# Patient Record
Sex: FEMALE | Race: WHITE
[De-identification: names, ages, dates, MRNs, and addresses within clinical notes are randomized per-mention and may not be internally consistent; named-entity substitution may affect disease eponyms.]

---

## 2019-09-12 ENCOUNTER — HOSPITAL ENCOUNTER (OUTPATIENT)
Dept: HOSPITAL 43 - DL.ENDO | Age: 59
Discharge: HOME | End: 2019-09-12
Attending: SURGERY
Payer: COMMERCIAL

## 2019-09-12 DIAGNOSIS — Z92.3: ICD-10-CM

## 2019-09-12 DIAGNOSIS — Z92.21: ICD-10-CM

## 2019-09-12 DIAGNOSIS — Z85.828: ICD-10-CM

## 2019-09-12 DIAGNOSIS — K64.4: ICD-10-CM

## 2019-09-12 DIAGNOSIS — Z79.899: ICD-10-CM

## 2019-09-12 DIAGNOSIS — K59.00: Primary | ICD-10-CM

## 2019-09-12 PROCEDURE — G0121 COLON CA SCRN NOT HI RSK IND: HCPCS

## 2019-09-12 NOTE — OR
DATE:  09/12/2019

 

PREOPERATIVE DIAGNOSES:  Screening colonoscopy and constipation.

 

POSTOPERATIVE DIAGNOSES:  Screening colonoscopy and constipation.

 

PROCEDURE:  Total colonoscopy.

 

ANESTHESIA:  Conscious sedation with IV Versed and fentanyl.

 

SPECIMEN:  None.

 

OPERATIVE FINDINGS:  Normal colonoscopy.

 

RECOMMENDATION:  Followup colonoscopy in 10 years for screening.

 

PROCEDURE IN DETAIL:  After adequate preparation, a colonoscope was inserted

into the rectum.  After multiple maneuvers; however, I could not negotiate the

lower portion of the sigmoid just above the rectosigmoid junction.  Multiple

maneuvers were tried, and I simply could not advance the scope safely.  I

changed this to a pediatric scope and was able to with some manipulation get

around this fixed site in the sigmoid.  At that point, then the scope was easily

advanced all the way to the cecum.  Confirmation of the cecum was made by

visualization of the ileocecal valve and palpation in the right lower quadrant.

There was also a visible light in the right lower quadrant.  The bowel prep was

very good.  On withdrawal of the scope, no abnormalities were noted.  A

photograph of the ileocecal valve had been taken.  Anal and rectal examination

reveals large external

hemorrhoids.  No significant internal hemorrhoids.  Air was suctioned from the

colon and the scope removed.

 

DD:  09/12/2019 07:18:48

DT:  09/12/2019 14:20:22

[facility]

Job #:  [id number]

## 2021-01-13 ENCOUNTER — HOSPITAL ENCOUNTER (OUTPATIENT)
Dept: HOSPITAL 43 - DL.ENDO | Age: 61
Discharge: HOME | End: 2021-01-13
Attending: SURGERY
Payer: COMMERCIAL

## 2021-01-13 DIAGNOSIS — Z01.812: ICD-10-CM

## 2021-01-13 DIAGNOSIS — Z85.048: ICD-10-CM

## 2021-01-13 DIAGNOSIS — Z20.822: ICD-10-CM

## 2021-01-13 DIAGNOSIS — K62.5: Primary | ICD-10-CM

## 2021-01-13 DIAGNOSIS — Z79.899: ICD-10-CM

## 2021-01-13 DIAGNOSIS — K64.4: ICD-10-CM

## 2021-01-13 PROCEDURE — 87635 SARS-COV-2 COVID-19 AMP PRB: CPT

## 2021-01-13 PROCEDURE — U0002 COVID-19 LAB TEST NON-CDC: HCPCS

## 2021-01-13 PROCEDURE — 45378 DIAGNOSTIC COLONOSCOPY: CPT

## 2021-01-13 NOTE — OR
DATE:  01/13/2021

 

PREOPERATIVE DIAGNOSIS:  Rectal bleeding and history of prior anal cancer.

 

POSTOPERATIVE DIAGNOSIS:  Rectal bleeding and history of prior anal cancer.

 

PROCEDURE:  Total colonoscopy.

 

ANESTHESIA:  Conscious sedation with IV Versed and fentanyl.

 

SPECIMEN:  None.

 

OPERATIVE FINDINGS:  External hemorrhoids, otherwise normal.

 

RECOMMENDATION:  Might consider a hemorrhoidectomy with this patient.  She does

not have significant internal hemorrhoids and the examination did not reveal any

unusual areas that I felt needed biopsy.  The rest of the colonoscopy is normal

and her followup should be 5 to 10 years.

 

INDICATION FOR PROCEDURE:  This 60-year-old female has a history of anal cancer

and now has new onset of rectal bleeding with bowel function.

 

PROCEDURE IN DETAIL:  After adequate preparation, a colonoscope was inserted

into the rectum.  This was quite difficult to get through a fixed area of the

sigmoid.  This is an external fixation adhesion probably from her prior

surgeries, but I was able to get the scope finally through the sigmoid and then

it was easy to pass the scope all the way to the cecum.  Confirmation of the

cecum was made by visualization of the ileocecal valve.  The bowel prep was

excellent.  On withdrawal of the scope, no abnormalities were noted.  I took a

photograph of the ileocecal valve and the anal canal.  Air was suctioned from

the colon, and the scope removed.

 

DD:  01/13/2021 08:12:30

DT:  01/13/2021 11:16:17

[facility]

Job #:  [id number]

## 2021-03-11 ENCOUNTER — HOSPITAL ENCOUNTER (OUTPATIENT)
Dept: HOSPITAL 43 - DL.SDS | Age: 61
Discharge: HOME | End: 2021-03-11
Attending: SURGERY
Payer: COMMERCIAL

## 2021-03-11 DIAGNOSIS — Z20.822: ICD-10-CM

## 2021-03-11 DIAGNOSIS — K64.4: Primary | ICD-10-CM

## 2021-03-11 DIAGNOSIS — I10: ICD-10-CM

## 2021-03-11 DIAGNOSIS — Z01.812: ICD-10-CM

## 2021-03-11 DIAGNOSIS — E66.9: ICD-10-CM

## 2021-03-11 DIAGNOSIS — Z79.890: ICD-10-CM

## 2021-03-11 DIAGNOSIS — Z79.899: ICD-10-CM

## 2021-03-11 DIAGNOSIS — Z85.048: ICD-10-CM

## 2021-03-11 DIAGNOSIS — E03.9: ICD-10-CM

## 2021-03-11 PROCEDURE — U0002 COVID-19 LAB TEST NON-CDC: HCPCS

## 2021-03-11 NOTE — OR
DATE:  03/11/2021

 

PREOPERATIVE DIAGNOSIS:  External hemorrhoids.

 

POSTOPERATIVE DIAGNOSIS:  External hemorrhoids.

 

PROCEDURE:  External hemorrhoidectomy.

 

ANESTHESIA:  Local plus MAC.

 

SPECIMEN:  Hemorrhoid.

 

INDICATIONS FOR PROCEDURE:  This 60-year-old female has multiple external

hemorrhoids.  She also has a history of anal cancer, undergoing chemotherapy

protocol for this.  She has some rectal bleeding, but underwent a recent

colonoscopy which was normal.

 

PROCEDURE IN DETAIL:  After adequate preparation, there were 2 anterior large

groups of hemorrhoids that were able to be excised as 1 specimen.  1% Xylocaine

was used to infiltrate a perianal block.  The external skin was cut in a juliana

shape and then carried down into the upper part of the anal canal.  The entire

hemorrhoid complex were removed as 1.  Hemostasis was controlled with cautery.

4-0 Vicryl sutures were used to close the deeper layer of tissue and the skin

was closed with interrupted 4-0 Vicryl.  This seemed to be adequate.  There is

one posterior small hemorrhoid that is left.  I decided to leave that since this

seems to be really adequate to clear this up.  The patient was taken to recovery

room.

 

DD:  03/11/2021 09:07:00

DT:  03/11/2021 10:20:02

[facility]

Job #:  [id number]

## 2021-09-13 ENCOUNTER — HOSPITAL ENCOUNTER (EMERGENCY)
Dept: HOSPITAL 43 - DL.ED | Age: 61
Discharge: HOME | End: 2021-09-13
Payer: COMMERCIAL

## 2021-09-13 DIAGNOSIS — Z20.822: ICD-10-CM

## 2021-09-13 DIAGNOSIS — E03.9: ICD-10-CM

## 2021-09-13 DIAGNOSIS — R05: Primary | ICD-10-CM

## 2021-09-13 DIAGNOSIS — Z79.899: ICD-10-CM

## 2021-09-13 PROCEDURE — U0002 COVID-19 LAB TEST NON-CDC: HCPCS

## 2021-09-13 NOTE — EDM.PDOC
ED HPI GENERAL MEDICAL PROBLEM





- General


Chief Complaint: Respiratory Problem


Stated Complaint: COVID TEST


Time Seen by Provider: 09/13/21 19:30


Source of Information: Reports: Patient, RN


History Limitations: Reports: No Limitations





- History of Present Illness


INITIAL COMMENTS - FREE TEXT/NARRATIVE: 





ED requesting COVID testing, Cough only sx but exposure through grandchild who 

was exposed at school. No fever No nausea or vomiting. Onset sx 3 days.


  ** Bilateral Generalized


Pain Score (Numeric/FACES): 4





- Related Data


                                    Allergies











Allergy/AdvReac Type Severity Reaction Status Date / Time


 


No Known Allergies Allergy   Verified 03/11/21 07:21











Home Meds: 


                                    Home Meds





Levothyroxine [Sythroid] 100 mcg PO DAILY 11/19/18 [History]


Venlafaxine HCl [Venlafaxine ER] 75 mg PO TID 11/19/18 [History]


buPROPion [buPROPion XL] 150 mg PO BID 11/19/18 [History]


hydroCHLOROthiazide [Hydrochlorothiazide] 25 mg PO DAILY 11/19/18 [History]











Past Medical History


HEENT History: Reports: Impaired Vision, Other (See Below)


Other HEENT History: WEARS CONTACT LENS


Cardiovascular History: Reports: Hypertension


Respiratory History: Reports: None


Gastrointestinal History: Reports: Chronic Constipation, Chronic Diarrhea, GERD,

 Hemorrhoids, Other (See Below)


Other Gastrointestinal History: urgency with diarrhea


Genitourinary History: Reports: None


OB/GYN History: Reports: Pregnancy


Musculoskeletal History: Reports: None, Fracture, Other (See Below)


Other Musculoskeletal History: CONTACTURE IN [place]


Neurological History: Reports: None


Psychiatric History: Reports: Autism, Depression


Endocrine/Metabolic History: Reports: Hypothyroidism


Hematologic History: Reports: None


Immunologic History: Reports: None


Oncologic (Cancer) History: Reports: Other (See Below)


Other Oncologic History: anal cancer


Dermatologic History: Reports: None





- Infectious Disease History


Infectious Disease History: Reports: Chicken Pox, Measles, Mumps





- Past Surgical History


Head Surgeries/Procedures: Reports: None


HEENT Surgical History: Reports: None


Cardiovascular Surgical History: Reports: None


Respiratory Surgical History: Reports: None


GI Surgical History: Reports: Cholecystectomy, Colonoscopy


Female  Surgical History: Reports: Breast Biopsy, Breast Reduction, 

Hysterectomy, Other (See Below)


Other Female  Surgeries/Procedures: granulated cyst to R) breast


Endocrine Surgical History: Reports: None


Neurological Surgical History: Reports: None


Musculoskeletal Surgical History: Reports: None


Oncologic Surgical History: Reports: None


Dermatological Surgical History: Reports: None





Social & Family History





- Tobacco Use


Tobacco Use Status *Q: Never Tobacco User





- Caffeine Use


Caffeine Use: Reports: Coffee


Caffeine Use Comment: 3 cups daily





- Recreational Drug Use


Recreational Drug Use: No





ED ROS GENERAL





- Review of Systems


Review Of Systems: Comprehensive ROS is negative, except as noted in HPI.





ED EXAM, GENERAL





- Physical Exam


Exam: See Below


Exam Limited By: No Limitations


General Appearance: Alert, No Apparent Distress


Eye Exam: Bilateral Eye: EOMI


Ears: Normal External Exam


Nose: Normal Inspection


Throat/Mouth: Normal Inspection


Head: Atraumatic, Other


Neck: Normal Inspection.  No: Lymphadenopathy (L), Lymphadenopathy (R)


Respiratory/Chest: No Respiratory Distress, Lungs Clear, Normal Breath Sounds, 

Other (rare dry cough)


GI/Abdominal: Normal Bowel Sounds


Extremities: Normal Inspection


Neurological: Alert, Oriented, Normal Cognition, Normal Gait


Psychiatric: Normal Affect, Normal Mood


Skin Exam: Warm, Dry, Intact





Course





- Vital Signs


Last Recorded V/S: 


                                Last Vital Signs











Temp  98.1 F   09/13/21 18:25


 


Pulse  106 H  09/13/21 18:25


 


Resp  20   09/13/21 18:25


 


BP  118/72   09/13/21 18:25


 


Pulse Ox  98   09/13/21 18:25














- Orders/Labs/Meds


Labs: 


                                Laboratory Tests











  09/13/21 Range/Units





  18:05 


 


SARS-CoV-2 RNA (ESTRELLA)  Negative  (NEGATIVE)  














Departure





- Departure


Time of Disposition: 20:14


Disposition: Home, Self-Care 01


Condition: Good


Clinical Impression: 


 Cough with exposure to COVID-19 virus








- Discharge Information


Instructions:  COVID-19 Frequently Asked Questions, COVID-19: How to Protect 

Yourself and Others - CDC, COVID-19: Quarantine vs. Isolation - CDC (12/17/2020)


Forms:  ED Department Discharge


Additional Instructions: 


symptom management with muccinex/ robitussin for cough


increase fluids


humidification


continue isolation


retest later this week at local Drive through or local clinic


urgent follow up if SEVERE difficulty breathing


limit use of ibuprofen


tylenol 500mg every 4 hours as needed for cough





Sepsis Event Note (ED)





- Evaluation


Sepsis Screening Result: No Definite Risk

## 2021-10-22 ENCOUNTER — HOSPITAL ENCOUNTER (EMERGENCY)
Dept: HOSPITAL 43 - DL.ED | Age: 61
Discharge: HOME | End: 2021-10-22
Payer: COMMERCIAL

## 2021-10-22 DIAGNOSIS — E03.9: ICD-10-CM

## 2021-10-22 DIAGNOSIS — U07.1: Primary | ICD-10-CM

## 2021-10-22 DIAGNOSIS — I10: ICD-10-CM

## 2021-10-22 DIAGNOSIS — Z79.899: ICD-10-CM

## 2021-10-22 DIAGNOSIS — J12.82: ICD-10-CM

## 2021-10-22 DIAGNOSIS — E87.6: ICD-10-CM

## 2021-10-22 LAB
ANION GAP SERPL CALC-SCNC: 15.3 MEQ/L (ref 7–13)
APTT PPP: 26.4 SEC (ref 22–34)
CHLORIDE SERPL-SCNC: 93 MMOL/L (ref 98–107)
SODIUM SERPL-SCNC: 133 MMOL/L (ref 136–145)

## 2021-10-22 NOTE — CT
PROCEDURE INFORMATION: 

Exam: CT Chest With Contrast; Diagnostic 

Exam date and time: 10/22/2021 12:25 PM 

Age: 61 years old 

Clinical indication: Shortness of breath; Additional info: R/O pulmonary 

embolism 



TECHNIQUE: 

Imaging protocol: Diagnostic computed tomography of the chest with contrast. 

Radiation optimization: All CT scans at this facility use at least one of these 

dose optimization techniques: automated exposure control; mA and/or kV 

adjustment per patient size (includes targeted exams where dose is matched to 

clinical indication); or iterative reconstruction. 

Contrast material: ISOVUE 370; Contrast volume: 80 ml; Contrast route: 

INTRAVENOUS (IV);  



COMPARISON: 

No relevant prior studies available. 



FINDINGS: 

Lungs:  Scattered ground-glass airspace opacities in both lungs.  No masses. 

Pleural spaces: Unremarkable. No pneumothorax. No pleural effusion. 

Heart: Unremarkable. No cardiomegaly. No pericardial effusion. 

Aorta: Unremarkable. No aortic aneurysm. 

Lymph nodes: Unremarkable. No enlarged lymph nodes. 



Bones/joints: Unremarkable. No acute fracture. 

Soft tissues: Unremarkable. 



IMPRESSION: 

Multifocal pneumonitis concerning for :.  No evidence of acute PE.

## 2021-10-22 NOTE — EDM.PDOC
ED HPI GENERAL MEDICAL PROBLEM





- General


Stated Complaint: TO ER FROM SDS


Time Seen by Provider: 10/22/21 12:27


Source of Information: Reports: Patient, Provider, RN, RN Notes Reviewed


History Limitations: Reports: No Limitations





- History of Present Illness


INITIAL COMMENTS - FREE TEXT/NARRATIVE: 


Rosanne is a 62 y/o female who presents to the ED from same day surgery following 

administration of monoclonal antibodies for COVID-19.  The patient reports her 

symptoms began five days ago, including shortness of breath, fatigue, muscle 

aches, and cough, and have since worsened in that time.  She tested positive for

a COVID-19 infection yesterday in the clinic.  The patient states she did not 

become more short of breath during or following the monoclonal antibody 

treatment.  She notes her  is home with similar symptoms and they have 

been caring for their four y/o granddaughter who has not yet experienced similar

symptoms.  She denies fever, shaking chills, vision changes, dizziness, chest 

pain/pressure, palpitations, nausea, vomiting, abdominal pain, or diarrhea.  She

has taken no medications for her symptoms.  The patient denies tobacco, alcohol,

or recreational drug use. 








- Related Data


                                    Allergies











Allergy/AdvReac Type Severity Reaction Status Date / Time


 


No Known Allergies Allergy   Verified 10/22/21 14:01











Home Meds: 


                                    Home Meds





Venlafaxine HCl [Venlafaxine ER] 75 mg PO TID 18 [History]


hydroCHLOROthiazide [Hydrochlorothiazide] 25 mg PO DAILY 18 [History]


LORazepam [Ativan] 0.5 mg PO ASDIRECTED 10/22/21 [History]


Levothyroxine 112 mcg PO ACBREAKFAST 10/22/21 [History]


Oxybutynin Chloride [Ditropan Xl] 10 mg PO DAILY 10/22/21 [History]


buPROPion HCL [Bupropion HCl Sr] 300 mg PO DAILY 10/22/21 [History]


busPIRone HCl [busPIRone] 30 mg PO DAILY 10/22/21 [History]











Past Medical History


HEENT History: Reports: Impaired Vision, Other (See Below)


Other HEENT History: WEARS CONTACT LENS


Cardiovascular History: Reports: Hypertension


Respiratory History: Reports: None, SOB


Gastrointestinal History: Reports: Chronic Constipation, Chronic Diarrhea, GERD,

 Hemorrhoids, Other (See Below)


Other Gastrointestinal History: urgency with diarrhea


Genitourinary History: Reports: None


OB/GYN History: Reports: Pregnancy


Musculoskeletal History: Reports: None, Fracture, Other (See Below)


Other Musculoskeletal History: CONTACTURE IN [place]ILAT


Neurological History: Reports: None


Psychiatric History: Reports: Anxiety, Depression


Endocrine/Metabolic History: Reports: Hypothyroidism


Hematologic History: Reports: None


Immunologic History: Reports: None


Oncologic (Cancer) History: Reports: Other (See Below)


Other Oncologic History: anal cancer


Dermatologic History: Reports: None





- Infectious Disease History


Infectious Disease History: Reports: Chicken Pox, Measles, Mumps, Novel 

Coronavirus





- Past Surgical History


Head Surgeries/Procedures: Reports: None


HEENT Surgical History: Reports: None


Cardiovascular Surgical History: Reports: None


Respiratory Surgical History: Reports: None


GI Surgical History: Reports: Cholecystectomy, Colonoscopy


Other GI Surgeries/Procedures: Hemorrhoidectomy


Female  Surgical History: Reports: Breast Biopsy, Breast Reduction, 

Hysterectomy, Other (See Below)


Other Female  Surgeries/Procedures: granulated cyst to R) breast


Endocrine Surgical History: Reports: None


Neurological Surgical History: Reports: None


Musculoskeletal Surgical History: Reports: None


Oncologic Surgical History: Reports: None


Dermatological Surgical History: Reports: None





Social & Family History





- Family History


Family Medical History: No Pertinent Family History





- Caffeine Use


Caffeine Use: Reports: Coffee


Caffeine Use Comment: 3 cups daily





ED ROS GENERAL





- Review of Systems


Review Of Systems: Comprehensive ROS is negative, except as noted in HPI.





ED EXAM, GENERAL





- Physical Exam


Exam: See Below


Exam Limited By: No Limitations


General Appearance: Alert, No Apparent Distress


Eye Exam: Bilateral Eye: EOMI, Normal Inspection, PERRL (3mm)


Ears: Normal External Exam, Normal Canal, Hearing Grossly Normal, Normal TMs


Ear Exam: Bilateral Ear: Auricle Normal, Canal Normal, TM normal


Nose: Normal Inspection, Normal Mucosa, No Blood


Throat/Mouth: Normal Inspection, Normal Oropharynx, Normal Voice, No Airway 

Compromise


Head: Atraumatic, Normocephalic


Neck: Normal Inspection, Supple, Non-Tender, Full Range of Motion.  No: 

Lymphadenopathy (L), Lymphadenopathy (R)


Respiratory/Chest: No Respiratory Distress, Lungs Clear, Normal Breath Sounds, 

No Accessory Muscle Use, Chest Non-Tender.  No: Crackles, Rales, Rhonchi, 

Wheezing


Cardiovascular: Normal Peripheral Pulses, Regular Rate, Rhythm, No Gallop, No 

Murmur, No Rub


Peripheral Pulses: 2+: Radial (L), Radial (R)


GI/Abdominal: Normal Bowel Sounds, Soft, Non-Tender, No Distention, No Abnormal 

Bruit, No Mass, Pelvis Stable


 (Female) Exam: Deferred


Rectal (Female) Exam: Deferred


Back Exam: Normal Inspection, Full Range of Motion


Extremities: Normal Inspection, Normal Range of Motion, Normal Capillary Refill


Neurological: Alert, Oriented, CN II-XII Intact, Normal Cognition, Normal Gait, 

No Motor/Sensory Deficits


Psychiatric: Normal Affect, Normal Mood


Skin Exam: Warm, Dry, Intact, Normal Color, No Rash.  No: Cyanosis, Jaundice, 

Mottled, Pallor


Lymphatic: No Adenopathy





Course





- Vital Signs


Last Recorded V/S: 


                                Last Vital Signs











Temp  99.6 F   10/22/21 13:53


 


Pulse  85   10/22/21 13:53


 


Resp  18   10/22/21 13:53


 


BP  107/72   10/22/21 13:53


 


Pulse Ox  100   10/22/21 13:53














- Orders/Labs/Meds


Labs: 


                                Laboratory Tests











  10/22/21 10/22/21 10/22/21 Range/Units





  13:37 13:37 13:37 


 


WBC  8.7    (5.0-10.0)  10^3/uL


 


RBC  5.13    (4.2-5.4)  10^6/uL


 


Hgb  13.7    (12.0-16.0)  g/dL


 


Hct  40.1    (37.0-47.0)  %


 


MCV  78.2 L D    ()  fL


 


MCH  26.7 L    (27.0-34.0)  pg


 


MCHC  34.2    (33.0-35.0)  g/dL


 


Plt Count  238  D    (150-450)  10^3/uL


 


Neut % (Auto)  82.9 H    (42.2-75.2)  %


 


Lymph % (Auto)  12.7 L    (20.5-50.1)  %


 


Mono % (Auto)  4.2    (2-8)  %


 


Eos % (Auto)  0.1 L    (1.0-3.0)  %


 


Baso % (Auto)  0.1    (0.0-1.0)  %


 


PT     (9.0-12.0)  SEC


 


INR     (0.9-1.2)  


 


APTT     (22.0-34.0)  SEC


 


D-Dimer, Quantitative     (0-400)  ng/mL


 


Sodium   133 L D   (136-145)  mmol/L


 


Potassium   3.3 L   (3.5-5.1)  mmol/L


 


Chloride   93 L   ()  mmol/L


 


Carbon Dioxide   28   (21-32)  mmol/L


 


Anion Gap   15.3 H   (7-13)  mEq/L


 


BUN   15   (7-18)  mg/dL


 


Creatinine   0.89   (0.55-1.02)  mg/dL


 


Est Cr Clr Drug Dosing   54.21   mL/min


 


Estimated GFR (MDRD)   > 60   


 


BUN/Creatinine Ratio   16.9   (No establ ref range)  


 


Glucose   93   (70-99)  mg/dL


 


Lactic Acid    1.3  (0.4-2.0)  mmol/L


 


Calcium   8.7   (8.5-10.1)  mg/dL


 


Magnesium   2.2   (1.8-2.4)  mg/dL


 


Total Bilirubin   1.1 H   (0.2-1.0)  mg/dL


 


AST   38 H   (15-37)  U/L


 


ALT   74 H   (14-59)  U/L


 


Alkaline Phosphatase   96   ()  U/L


 


Troponin I High Sens   5   (<=51)  pg/mL


 


C-Reactive Protein   12.4 H   (0.0-0.9)  mg/dL


 


Total Protein   7.4   (6.4-8.2)  g/dL


 


Albumin   3.1 L   (3.4-5.0)  g/dL


 


Globulin   4.3   


 


Albumin/Globulin Ratio   0.72   


 


Amylase   47   ()  U/L


 


Lipase   204   ()  U/L


 


TSH, Ultra Sensitive   0.18 L   (0.36-3.74)  uIU/mL


 


Urine Color     (YELLOW)  


 


Urine Appearance     (CLEAR)  


 


Urine pH     (5.0-9.0)  


 


Ur Specific Gravity     (1.005-1.030)  


 


Urine Protein     (NEGATIVE)  


 


Urine Glucose (UA)     (NEGATIVE)  


 


Urine Ketones     (NEGATIVE)  


 


Urine Occult Blood     (NEGATIVE)  


 


Urine Nitrite     (NEGATIVE)  


 


Urine Bilirubin     (NEGATIVE)  


 


Urine Urobilinogen     (0.2-1.0)  mg/dL


 


Ur Leukocyte Esterase     (NEGATIVE)  


 


Urine RBC     (0-5)  /HPF


 


Urine WBC     (0-5/HPF)  /HPF


 


Ur Epithelial Cells     (NOT SEEN)  /HPF














  10/22/21 10/22/21 Range/Units





  13:37 14:09 


 


WBC    (5.0-10.0)  10^3/uL


 


RBC    (4.2-5.4)  10^6/uL


 


Hgb    (12.0-16.0)  g/dL


 


Hct    (37.0-47.0)  %


 


MCV    ()  fL


 


MCH    (27.0-34.0)  pg


 


MCHC    (33.0-35.0)  g/dL


 


Plt Count    (150-450)  10^3/uL


 


Neut % (Auto)    (42.2-75.2)  %


 


Lymph % (Auto)    (20.5-50.1)  %


 


Mono % (Auto)    (2-8)  %


 


Eos % (Auto)    (1.0-3.0)  %


 


Baso % (Auto)    (0.0-1.0)  %


 


PT  10.9   (9.0-12.0)  SEC


 


INR  1.1   (0.9-1.2)  


 


APTT  26.4   (22.0-34.0)  SEC


 


D-Dimer, Quantitative  209   (0-400)  ng/mL


 


Sodium    (136-145)  mmol/L


 


Potassium    (3.5-5.1)  mmol/L


 


Chloride    ()  mmol/L


 


Carbon Dioxide    (21-32)  mmol/L


 


Anion Gap    (7-13)  mEq/L


 


BUN    (7-18)  mg/dL


 


Creatinine    (0.55-1.02)  mg/dL


 


Est Cr Clr Drug Dosing    mL/min


 


Estimated GFR (MDRD)    


 


BUN/Creatinine Ratio    (No establ ref range)  


 


Glucose    (70-99)  mg/dL


 


Lactic Acid    (0.4-2.0)  mmol/L


 


Calcium    (8.5-10.1)  mg/dL


 


Magnesium    (1.8-2.4)  mg/dL


 


Total Bilirubin    (0.2-1.0)  mg/dL


 


AST    (15-37)  U/L


 


ALT    (14-59)  U/L


 


Alkaline Phosphatase    ()  U/L


 


Troponin I High Sens    (<=51)  pg/mL


 


C-Reactive Protein    (0.0-0.9)  mg/dL


 


Total Protein    (6.4-8.2)  g/dL


 


Albumin    (3.4-5.0)  g/dL


 


Globulin    


 


Albumin/Globulin Ratio    


 


Amylase    ()  U/L


 


Lipase    ()  U/L


 


TSH, Ultra Sensitive    (0.36-3.74)  uIU/mL


 


Urine Color   Yellow  (YELLOW)  


 


Urine Appearance   Clear  (CLEAR)  


 


Urine pH   7.0  (5.0-9.0)  


 


Ur Specific Gravity   1.010  (1.005-1.030)  


 


Urine Protein   Negative  (NEGATIVE)  


 


Urine Glucose (UA)   Negative  (NEGATIVE)  


 


Urine Ketones   40 H  (NEGATIVE)  


 


Urine Occult Blood   Trace-intact H  (NEGATIVE)  


 


Urine Nitrite   Negative  (NEGATIVE)  


 


Urine Bilirubin   Negative  (NEGATIVE)  


 


Urine Urobilinogen   0.2  (0.2-1.0)  mg/dL


 


Ur Leukocyte Esterase   Negative  (NEGATIVE)  


 


Urine RBC   0-5  (0-5)  /HPF


 


Urine WBC   0-5  (0-5/HPF)  /HPF


 


Ur Epithelial Cells   Few  (NOT SEEN)  /HPF











Meds: 


Medications














Discontinued Medications














Generic Name Dose Route Start Last Admin





  Trade Name Freq  PRN Reason Stop Dose Admin


 


Dexamethasone  6 mg  10/22/21 14:57  10/22/21 15:22





  Dexamethasone 4 Mg/Ml Sdv  IVPUSH  10/22/21 14:58  6 mg





  ONETIME ONE   Administration


 


Iopamidol  100 ml  10/22/21 12:09  10/22/21 12:35





  Iopamidol 755 Mg/Ml 100 Ml Bottle  IVPUSH  10/22/21 12:10  80 ml





  ONETIME ONE   Administration














- Radiology Interpretation


Free Text/Narrative:: 


[facility]


Final Radiology Report  Call: [phone]


assistance Online chat: [email or web address]


Name: ROSANNE JACKSON Age: 61Years F Date: 10/22/2021


MRN: [id number] SSN: -- : 1960


Study: CT CHEST W CONT Requesting Physician: Zuleyka Ramirez


Accession: [id number] Images: [id number]


Addl Studies:


Provided Clinical History: r/o pulmonary embolism


Contrast: With Contrast Medium: ISOVUE 370


Contrast Amount: 80 mL Contrast Method: Intravenous (IV)


Page 1 of 2


PROCEDURE INFORMATION:


Exam: CT Chest With Contrast; Diagnostic


Exam date and time: 10/22/2021 12:25 PM


Age: 61 years old


Clinical indication: Shortness of breath; Additional info: R/O pulmonary 

embolism


TECHNIQUE:


Imaging protocol: Diagnostic computed tomography of the chest with contrast.


Radiation optimization: All CT scans at this facility use at least one of these 

dose optimization


techniques: automated exposure control; mA and/or kV adjustment per patient size

 (includes targeted


exams where dose is matched to clinical indication); or iterative 

reconstruction.


Contrast material: ISOVUE 370; Contrast volume: 80 ml; Contrast route: 

INTRAVENOUS (IV);


COMPARISON:


No relevant prior studies available.


FINDINGS:


Lungs: Scattered ground-glass airspace opacities in both lungs. No masses.


Pleural spaces: Unremarkable. No pneumothorax. No pleural effusion.


Heart: Unremarkable. No cardiomegaly. No pericardial effusion.


Aorta: Unremarkable. No aortic aneurysm.


Lymph nodes: Unremarkable. No enlarged lymph nodes.


Bones/joints: Unremarkable. No acute fracture.


Soft tissues: Unremarkable.


IMPRESSION:


Multifocal pneumonitis concerning for :. No evidence of acute PE.





Thank you for allowing us to participate in the care of your patient.


Dictated and Authenticated by: Buddy Abdullahi MD


10/22/2021 1:11 PM Central Time ([place] & [place])








- Re-Assessments/Exams


Free Text/Narrative Re-Assessment/Exam: 





10/22/21


CT chest w obtained to r/o PE. 





Findings of examination, lab work, and imaging reviewed with patient.  Will 

treat with dexamethasone.  Supportive cares discussed.  Patient instructed to 

follow up with primary care provider following quarantine regarding todays 

visit.  Red flag signs and symptoms which would warrant immediate reevaluation 

reviewed.  Patient verbalized understanding and agreement with the plan of care.

  








Departure





- Departure


Time of Disposition: 14:58


Disposition: Home, Self-Care 01


Condition: Fair


Clinical Impression: 


 Pneumonia due to COVID-19 virus, Hypokalemia








- Discharge Information


*PRESCRIPTION DRUG MONITORING PROGRAM REVIEWED*: Not Applicable


*COPY OF PRESCRIPTION DRUG MONITORING REPORT IN PATIENT CARLOS: Not Applicable


Instructions:  COVID-19 Frequently Asked Questions, 10 Things You Can Do to 

Manage Your COVID-19 Symptoms at Home - [facility] (2021), COVID-19: How to 

Protect Yourself and Others - CDC, COVID-19: What to Do If You Are Sick- [facility] ()


Referrals: 


PCP,None [Primary Care Provider] - 


Forms:  ED Department Discharge


Additional Instructions: 


Rx: Dexamethasone


Rx: Tessalon Perles





1.) Rest. 


2.) Follow up with your primary care provider regarding today's visit following 

quarantine. 


3.) Return to the emergency department with any persistent or worsening symptoms

despite medications.